# Patient Record
Sex: FEMALE | Race: WHITE | NOT HISPANIC OR LATINO | URBAN - METROPOLITAN AREA
[De-identification: names, ages, dates, MRNs, and addresses within clinical notes are randomized per-mention and may not be internally consistent; named-entity substitution may affect disease eponyms.]

---

## 2019-06-25 ENCOUNTER — MEDICAL CORRESPONDENCE (OUTPATIENT)
Dept: HEALTH INFORMATION MANAGEMENT | Facility: CLINIC | Age: 24
End: 2019-06-25

## 2019-07-13 ENCOUNTER — TRANSFERRED RECORDS (OUTPATIENT)
Dept: HEALTH INFORMATION MANAGEMENT | Facility: CLINIC | Age: 24
End: 2019-07-13

## 2019-07-15 ENCOUNTER — TRANSFERRED RECORDS (OUTPATIENT)
Dept: HEALTH INFORMATION MANAGEMENT | Facility: CLINIC | Age: 24
End: 2019-07-15

## 2019-07-18 DIAGNOSIS — H15.019 ANTERIOR SCLERITIS, UNSPECIFIED LATERALITY: Primary | ICD-10-CM

## 2019-07-19 ENCOUNTER — OFFICE VISIT (OUTPATIENT)
Dept: OPHTHALMOLOGY | Facility: CLINIC | Age: 24
End: 2019-07-19
Attending: OPHTHALMOLOGY
Payer: COMMERCIAL

## 2019-07-19 DIAGNOSIS — H15.89 OTHER DISORDERS OF SCLERA: ICD-10-CM

## 2019-07-19 DIAGNOSIS — H15.012 ANTERIOR SCLERITIS OF EYE, LEFT: Primary | ICD-10-CM

## 2019-07-19 DIAGNOSIS — Z79.2 PROPHYLACTIC ANTIBIOTIC: ICD-10-CM

## 2019-07-19 DIAGNOSIS — H15.019 ANTERIOR SCLERITIS, UNSPECIFIED LATERALITY: ICD-10-CM

## 2019-07-19 PROCEDURE — G0463 HOSPITAL OUTPT CLINIC VISIT: HCPCS | Mod: ZF

## 2019-07-19 PROCEDURE — 92133 CPTRZD OPH DX IMG PST SGM ON: CPT | Mod: ZF | Performed by: OPHTHALMOLOGY

## 2019-07-19 PROCEDURE — 92134 CPTRZ OPH DX IMG PST SGM RTA: CPT | Mod: ZF | Performed by: OPHTHALMOLOGY

## 2019-07-19 RX ORDER — DOCUSATE SODIUM 100 MG/1
1 CAPSULE, LIQUID FILLED ORAL
COMMUNITY
End: 2019-10-18

## 2019-07-19 RX ORDER — MULTIVITAMIN
TABLET ORAL DAILY
COMMUNITY
End: 2019-10-18

## 2019-07-19 RX ORDER — DOXYCYCLINE HYCLATE 100 MG
TABLET ORAL DAILY
Refills: 0 | COMMUNITY
Start: 2019-01-18

## 2019-07-19 RX ORDER — MULTIVIT WITH MINERALS/LUTEIN
250 TABLET ORAL
COMMUNITY

## 2019-07-19 RX ORDER — PREDNISONE 20 MG/1
60 TABLET ORAL DAILY
Qty: 90 TABLET | Refills: 0 | Status: SHIPPED | OUTPATIENT
Start: 2019-07-19 | End: 2019-10-18

## 2019-07-19 RX ORDER — SULFAMETHOXAZOLE/TRIMETHOPRIM 800-160 MG
1 TABLET ORAL EVERY OTHER DAY
Qty: 30 TABLET | Refills: 0 | Status: SHIPPED | OUTPATIENT
Start: 2019-07-19 | End: 2019-10-18

## 2019-07-19 RX ORDER — FLUCONAZOLE 150 MG/1
TABLET ORAL
COMMUNITY
Start: 2019-07-13 | End: 2019-10-18

## 2019-07-19 RX ORDER — PREDNISOLONE ACETATE 10 MG/ML
SUSPENSION/ DROPS OPHTHALMIC
Refills: 0 | COMMUNITY
Start: 2019-05-13 | End: 2019-10-18

## 2019-07-19 ASSESSMENT — TONOMETRY
IOP_METHOD: TONOPEN
OS_IOP_MMHG: 19
OD_IOP_MMHG: 19

## 2019-07-19 ASSESSMENT — VISUAL ACUITY
METHOD: SNELLEN - LINEAR
OS_SC: 20/20
OD_SC: 20/20

## 2019-07-19 ASSESSMENT — CONF VISUAL FIELD
OS_NORMAL: 1
METHOD: COUNTING FINGERS
OD_NORMAL: 1

## 2019-07-19 ASSESSMENT — SLIT LAMP EXAM - LIDS
COMMENTS: NORMAL
COMMENTS: NORMAL

## 2019-07-19 ASSESSMENT — CUP TO DISC RATIO
OS_RATIO: 0.25
OD_RATIO: 0.25

## 2019-07-19 ASSESSMENT — EXTERNAL EXAM - LEFT EYE: OS_EXAM: NORMAL

## 2019-07-19 ASSESSMENT — EXTERNAL EXAM - RIGHT EYE: OD_EXAM: NORMAL

## 2019-07-19 NOTE — PROGRESS NOTES
HPI: Hayley Parson is a 24 year old female referred by Jared Veras and Rajesh Connelly for scleritis evaluation. She reports sudden onset of intense left eye redness on the morning of 5.10.19, with left eye pain developing in the pm. Her symptoms worsened over the next few days, so she presented for an eye exam and was diagnosed with episcleritis initially and then scleritis.   She used Predforte for about 2 weeks, which did not seem to help, and then added ibuprofen, which relieved the pain but not redness and also caused stomach upset. Other NSAIDs have seemed similar to ibuprofen. She reports that the redness went away with high dose oral prednisone (max dose 60 mg/day) but then recurred on lower doses. Four days ago, her prednisone was increased from 10 mg/day to 20 mg/day but she still is symptomatic, with left eye redness and a constant stabbing left eye pain. She also describes intermittent pain radiating to her left cheek. No visual complaints.    Current ocular medications: oral prednisone (20 mg/day since 7.15.19; started 60 mg/day 6.4.19 for 1 week then rapid taper with flare just after stopping, increased to 20 mg/day 6.25.19 with slower taper flaring at 10 mg/day), naproxen as needed (GI upset so not taking often).  Prior ocular medications: Predforte (ineffective), ibuprofen (only partially effective, GI upset), indomethacin (only partially effective - helped with pain but not redness, GI upset).    Ocular history:   1. Scleritis/episcleritis left eye, first diagnosed 5.13.19.  2. No history of ocular trauma, surgery, laser, or injection.    Medical history:  1. GERD  2. Constipation  3. H/o anemia  4. Endometriosis  5. Raynaud's  6. Depression  7. S/p ear tubes placement (1995), appendectomy (~2008), wisdom teeth removal (~2009), tonsillectomy (June 2018), and ovarian cysts removal (Oct 2018)    Review of systems: Positive for headaches associated with eye pain, chest pain (point tenderness along  ribs/sternum), back pain exacerbated by activity since 9th grade, knee pain (left worse than right, stiff with inactivity), and constipation.  Negative for fatigue, weight loss, fevers, chills, night sweats, seizure, fainting, numbness/tingling, weakness, oral ulcers, genital ulcers, pathergy reaction, recurrent nosebleeds, sinusitis, hearing loss, tinnitus, chronic cough, shortness of breath, skin rash, tick bite, easy bruising, easy bleeding, recurrent diarrhea, bloody stools, bloody urine.    Family history: Negative for ocular disease. Positive for celiac disease (mother), Charcot-Christen-Tooth disorder (maternal aunt), thyroid disease, diabetes mellitus.    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 She reports fewer than 1 alcoholic beverage per 2 weeks. No IV drug use. No pets. She has never lived outside the [place]. She is not pregnant and is not planning to become pregnant in the near future.    Laboratory/Imaging Results:  She reports negative Tb testing within the last 2 years (for work).  5.22.19 syphilis IgM/IgG, Lyme, complete blood count (CBC) with diff, RF, ANCA, anti-MPO, anti-Pro3, uric acid, ESR (10), and CRP all normal/negative.  6.5.19 lysozyme, ACE (35, normal 8-52), and HLA-B27 all normal/negative.  7.10.19 SI joint Xray performed - results not available for review.  7.19.19 Quantiferon-Tb, anti-CCP, EMMA, anti-dsDNA all normal/negative.    Ocular Imaging:  Macular OCT 7.19.19:  Right eye: thick choroid on AVA, no chorioretinal folds, good foveal contour, no IRF/SRF,  um  Left eye: thick choroid on AVA, no chorioretinal folds, good foveal contour, no IRF/SRF,  um  Choroidal thickness symmetric, with right eye ~ left eye.    Retinal nerve fiber layer OCT 7.19.19: within normal limits both eyes.    Impression/Plan:  1. 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 No associated keratitis,  uveitis, or posterior scleritis. Poor tolerance of oral NSAIDs. Active today on 20 mg/day oral prednisone.   - Quantiferon-Tb, EMMA, anti-dsDNA, anti-CCP today to complete scleritis work-up - all negative (see results above)   - Chest x-ray ordered but not completed; recommend to help rule out sarcoidosis   - Increase oral prednisone to 60 mg/day for 2 weeks then 40 mg/day for 2 weeks then 20 mg/day (may adjust dose if needed for mild flare while tapering)   - Start Bactrim DS every other day for Pneumocystis pneumonia prophylaxis while taking >= 20 mg/day oral prednisone   - Start calcium-vitamin D supplementation while on prednisone   - [place] rheumatology consult placed to evaluate for systemic inflammatory disease and if needed to manage systemic steroid-sparing immunosuppression (office staff to help coordinate)   - If scleritis flares during oral prednisone taper, or patient is unable to taper off oral prednisone without scleritis flaring, then I recommend that she start either methotrexate (goal dose 20-25 mg/wk) or CellCept (goal dose 1703-0447 mg twice a day) with rheumatology management    Return to clinic with Dr. Connelly in 3 weeks. Return to uveitis clinic in 2-3 months, V/T.    Attending Physician Attestation:  Complete documentation of historical and exam elements from today's encounter can be found in the full encounter summary report (not reduplicated in this progress note).  I personally obtained the chief complaint(s) and history of present illness.  I confirmed and edited as necessary the review of systems, past medical/surgical history, family history, social history, and examination findings as documented by others; and I examined the patient myself.  I personally reviewed the relevant tests, images, and reports as documented above.  I formulated and edited as necessary the assessment and plan and discussed the findings and management plan with the patient and family.  - Adriana Peters,  M.D.

## 2019-07-19 NOTE — LETTER
7/19/2019       RE: Hayley Parson  [address]  [facility] [address]     Dear Colleague,    Thank you for referring your patient, Hayley Parson, to the EYE CLINIC at [facility]. Please see a copy of my visit note below.    HPI: Hayley Parson is a 24 year old female referred by Jared Veras and Rajesh Connelly for scleritis evaluation. She reports sudden onset of intense left eye redness on the morning of 5.10.19, with left eye pain developing in the pm. Her symptoms worsened over the next few days, so she presented for an eye exam and was diagnosed with episcleritis initially and then scleritis.   She used Predforte for about 2 weeks, which did not seem to help, and then added ibuprofen, which relieved the pain but not redness and also caused stomach upset. Other NSAIDs have seemed similar to ibuprofen. She reports that the redness went away with high dose oral prednisone (max dose 60 mg/day) but then recurred on lower doses. Four days ago, her prednisone was increased from 10 mg/day to 20 mg/day but she still is symptomatic, with left eye redness and a constant stabbing left eye pain. She also describes intermittent pain radiating to her left cheek. No visual complaints.    Current ocular medications: oral prednisone (20 mg/day since 7.15.19; started 60 mg/day 6.4.19 for 1 week then rapid taper with flare just after stopping, increased to 20 mg/day 6.25.19 with slower taper flaring at 10 mg/day), naproxen as needed (GI upset so not taking often).  Prior ocular medications: Predforte (ineffective), ibuprofen (only partially effective, GI upset), indomethacin (only partially effective - helped with pain but not redness, GI upset).    Ocular history:   1. Scleritis/episcleritis left eye, first diagnosed 5.13.19.  2. No history of ocular trauma, surgery, laser, or injection.    Medical history:  1. GERD  2. Constipation  3. H/o anemia  4. Endometriosis  5. Raynaud's  6.  Depression  7. S/p ear tubes placement (1995), appendectomy (~2008), wisdom teeth removal (~2009), tonsillectomy (June 2018), and ovarian cysts removal (Oct 2018)    Review of systems: Positive for headaches associated with eye pain, chest pain (point tenderness along ribs/sternum), back pain exacerbated by activity since 9th grade, knee pain (left worse than right, stiff with inactivity), and constipation.  Negative for fatigue, weight loss, fevers, chills, night sweats, seizure, fainting, numbness/tingling, weakness, oral ulcers, genital ulcers, pathergy reaction, recurrent nosebleeds, sinusitis, hearing loss, tinnitus, chronic cough, shortness of breath, skin rash, tick bite, easy bruising, easy bleeding, recurrent diarrhea, bloody stools, bloody urine.    Family history: Negative for ocular disease. Positive for celiac disease (mother), Charcot-Christen-Tooth disorder (maternal aunt), thyroid disease, diabetes mellitus.    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 She reports fewer than 1 alcoholic beverage per 2 weeks. No IV drug use. No pets. She has never lived outside the [place]. She is not pregnant and is not planning to become pregnant in the near future.    Laboratory/Imaging Results:  She reports negative Tb testing within the last 2 years (for work).  5.22.19 syphilis IgM/IgG, Lyme, complete blood count (CBC) with diff, RF, ANCA, anti-MPO, anti-Pro3, uric acid, ESR (10), and CRP all normal/negative.  6.5.19 lysozyme, ACE (35, normal 8-52), and HLA-B27 all normal/negative.  7.10.19 SI joint Xray performed - results not available for review.  7.19.19 Quantiferon-Tb, anti-CCP, EMMA, anti-dsDNA all normal/negative.    Ocular Imaging:  Macular OCT 7.19.19:  Right eye: thick choroid on AVA, no chorioretinal folds, good foveal contour, no IRF/SRF,  um  Left eye: thick choroid on AVA, no chorioretinal folds, good foveal contour, no IRF/SRF,  um  Choroidal  thickness symmetric, with right eye ~ left eye.    Retinal nerve fiber layer OCT 7.19.19: within normal limits both eyes.    Impression/Plan:  1. 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 No associated keratitis, uveitis, or posterior scleritis. Poor tolerance of oral NSAIDs. Active today on 20 mg/day oral prednisone.   - Quantiferon-Tb, EMMA, anti-dsDNA, anti-CCP today to complete scleritis work-up - all negative (see results above)   - Chest x-ray ordered but not completed; recommend to help rule out sarcoidosis   - Increase oral prednisone to 60 mg/day for 2 weeks then 40 mg/day for 2 weeks then 20 mg/day (may adjust dose if needed for mild flare while tapering)   - Start Bactrim DS every other day for Pneumocystis pneumonia prophylaxis while taking >= 20 mg/day oral prednisone   - Start calcium-vitamin D supplementation while on prednisone   - [place] rheumatology consult placed to evaluate for systemic inflammatory disease and if needed to manage systemic steroid-sparing immunosuppression (office staff to help coordinate)   - If scleritis flares during oral prednisone taper, or patient is unable to taper off oral prednisone without scleritis flaring, then I recommend that she start either methotrexate (goal dose 20-25 mg/wk) or CellCept (goal dose 8007-6863 mg twice a day) with rheumatology management    Return to clinic with Dr. Connelly in 3 weeks. Return to uveitis clinic in 2-3 months, V/T.    Attending Physician Attestation:  Complete documentation of historical and exam elements from today's encounter can be found in the full encounter summary report (not reduplicated in this progress note).  I personally obtained the chief complaint(s) and history of present illness.  I confirmed and edited as necessary the review of systems, past medical/surgical history, family history, social history, and examination findings as documented by others; and I examined  the patient myself.  I personally reviewed the relevant tests, images, and reports as documented above.  I formulated and edited as necessary the assessment and plan and discussed the findings and management plan with the patient and family.  - Adriana Peters M.D.      Again, thank you for allowing me to participate in the care of your patient.      Sincerely,    Adriana Peters MD

## 2019-07-19 NOTE — Clinical Note
Reagan Marshall, I placed a rheum consult order for this patient - would be best if this takes placed in [place], [place]. Do you mind coordinating this? Thanks, Adriana

## 2019-07-19 NOTE — NURSING NOTE
Chief Complaints and History of Present Illnesses   Patient presents with     Annual Eye Exam     Uveitis     Chief Complaint(s) and History of Present Illness(es)     Annual Eye Exam     Laterality: both eyes    Onset: gradual    Onset: months ago    Course: rapidly worsening    Associated symptoms: eye pain, redness, headache and photophobia.  Negative for glare, haloes, double vision, dryness, nausea, vomiting, flashes and floaters    Treatments tried: no treatments    Response to treatment: no improvement    Pain scale: 6/10    Comments: Uveitis              Comments     Pt reports of having redness in her left eye for about 2 months, treatment has helped but still in discomfort. States eye will be in pain first followed by left temporal sided headache only. In the past week pain has gotten worse along with the headaches.   Is in some pain this morning  Denies flashes and floaters.    Bennie GOLDEN July 19, 2019 8:28 AM  Holley STEWART July 19, 2019 8:28 AM

## 2019-07-19 NOTE — PATIENT INSTRUCTIONS
Start calcium-vitamin D supplementation (1200 mg Ca)  Start Bactrim every other day for pneumonia prophylaxis at prednisone doses >= 20 mg/day  Prednisone 60 mg/day for 2 weeks then 40 mg/day for 2 weeks then 20 mg/day  See Dr. Connelly in 3 weeks  See rheumatology in [place] to evaluate for systemic inflammatory disease and possible steroid sparing immunosuppression.

## 2019-07-22 LAB
ANA SER QL IF: NEGATIVE
CCP AB SER IA-ACNC: <1 U/ML
DSDNA AB SER-ACNC: 1 IU/ML
GAMMA INTERFERON BACKGROUND BLD IA-ACNC: 0.05 IU/ML
M TB IFN-G BLD-IMP: NEGATIVE
M TB IFN-G CD4+ BCKGRND COR BLD-ACNC: >10 IU/ML
MITOGEN IGNF BCKGRD COR BLD-ACNC: 0 IU/ML
MITOGEN IGNF BCKGRD COR BLD-ACNC: 0 IU/ML

## 2019-07-29 ENCOUNTER — TELEPHONE (OUTPATIENT)
Dept: OPHTHALMOLOGY | Facility: CLINIC | Age: 24
End: 2019-07-29

## 2019-07-31 ENCOUNTER — MEDICAL CORRESPONDENCE (OUTPATIENT)
Dept: HEALTH INFORMATION MANAGEMENT | Facility: CLINIC | Age: 24
End: 2019-07-31

## 2019-08-09 ENCOUNTER — TELEPHONE (OUTPATIENT)
Dept: OPHTHALMOLOGY | Facility: CLINIC | Age: 24
End: 2019-08-09

## 2019-08-09 NOTE — TELEPHONE ENCOUNTER
Both telephone numbers called. I was unable to leave a message on Dr. Connelly's cell phone, so I left a message at the eye clinic number. After finishing the 20 mg/day dose, patient may continue prednisone for 10 mg/day for 2 more weeks then stop.    Adriana Peters MD 5:51 PM 08/09/19        Note to Dr. Peters/faciliator for assistance with plan of care per message below  Paul Ascencio RN 11:21 AM 08/09/19          [facility] Call Center    Phone Message    May a detailed message be left on voicemail: yes    Reason for Call: Medication Question or concern regarding medication   Prescription Clarification  Name of Medication: predniSONE (DELTASONE) 20 MG tablet    Prescribing Provider: Adriana Peters MD   [id number] - [place], [place] - [address] [facility] [address]   What on the order needs clarification?   Pt doing well, reduced steriod per recommendation. Wants to know if they should discontinue after 2 weeks? Or continue until she sees Rheumatology? Please call regarding this matter asap. Thanks  - Per Dr Luz Connelly  Ophthalmologist with [facility] in [place]            Action Taken: Message routed to:  [facility] ([facility] Eye

## 2019-10-18 ENCOUNTER — OFFICE VISIT (OUTPATIENT)
Dept: OPHTHALMOLOGY | Facility: CLINIC | Age: 24
End: 2019-10-18
Attending: OPHTHALMOLOGY
Payer: COMMERCIAL

## 2019-10-18 DIAGNOSIS — H15.012 ANTERIOR SCLERITIS OF EYE, LEFT: Primary | ICD-10-CM

## 2019-10-18 DIAGNOSIS — Z79.2 PROPHYLACTIC ANTIBIOTIC: ICD-10-CM

## 2019-10-18 PROCEDURE — G0463 HOSPITAL OUTPT CLINIC VISIT: HCPCS | Mod: ZF

## 2019-10-18 RX ORDER — PREDNISONE 10 MG/1
1 TABLET ORAL 3 TIMES DAILY
Refills: 0 | COMMUNITY
Start: 2019-08-21 | End: 2020-06-12

## 2019-10-18 RX ORDER — SULFAMETHOXAZOLE/TRIMETHOPRIM 800-160 MG
1 TABLET ORAL
COMMUNITY
Start: 2018-08-27 | End: 2019-10-18

## 2019-10-18 RX ORDER — BIOTIN 10 MG
TABLET ORAL
COMMUNITY

## 2019-10-18 RX ORDER — DOCUSATE SODIUM 100 MG/1
1 CAPSULE, LIQUID FILLED ORAL DAILY
COMMUNITY

## 2019-10-18 RX ORDER — SULFAMETHOXAZOLE/TRIMETHOPRIM 800-160 MG
1 TABLET ORAL EVERY OTHER DAY
Qty: 30 TABLET | Refills: 2 | Status: SHIPPED | OUTPATIENT
Start: 2019-10-18 | End: 2020-06-12

## 2019-10-18 RX ORDER — FOLIC ACID 1 MG/1
2 TABLET ORAL DAILY
Refills: 1 | COMMUNITY
Start: 2019-10-01

## 2019-10-18 RX ORDER — PREDNISONE 10 MG/1
TABLET ORAL
Qty: 146 TABLET | Refills: 0 | Status: SHIPPED | OUTPATIENT
Start: 2019-10-18 | End: 2019-12-01

## 2019-10-18 ASSESSMENT — CUP TO DISC RATIO
OS_RATIO: 0.25
OD_RATIO: 0.25

## 2019-10-18 ASSESSMENT — CONF VISUAL FIELD
METHOD: COUNTING FINGERS
OS_NORMAL: 1
OD_NORMAL: 1

## 2019-10-18 ASSESSMENT — SLIT LAMP EXAM - LIDS
COMMENTS: NORMAL
COMMENTS: NORMAL

## 2019-10-18 ASSESSMENT — TONOMETRY
IOP_METHOD: TONOPEN
OS_IOP_MMHG: 22
OD_IOP_MMHG: 19

## 2019-10-18 ASSESSMENT — VISUAL ACUITY
OS_SC: 20/20
OD_SC+: -1
METHOD: SNELLEN - LINEAR
OD_SC: 20/20

## 2019-10-18 ASSESSMENT — EXTERNAL EXAM - RIGHT EYE: OD_EXAM: NORMAL

## 2019-10-18 ASSESSMENT — EXTERNAL EXAM - LEFT EYE: OS_EXAM: NORMAL

## 2019-10-18 NOTE — LETTER
10/18/2019    RE: Hayley Parson  [address]  [facility] [address]     Dear Colleagues:    HPI: Hayley Parson is a 24 year old female here for 2.5 month follow up of scleritis left eye. She reports that the redness went away with high dose oral prednisone (max dose 60 mg/day) but then again recurred on lower doses; she feels that scleritis seems to flare at about 20 mg/day oral pred. No visual complaints but left eye is red and uncomfortable.    Current ocular medications: oral prednisone (30 mg/day for 3-4 weeks, variable doses since 6.4.19 with max dose 60 mg/day), naproxen as needed (GI upset so not taking often), methotrexate 22.5 mg/wk (increased from 12.5 mg/wk 3 weeks ago, started in mid-August 2019, s/e nausea and fatigue), folic acid 2 mg/day, Bactrim every other day. IMT prescribed by rheum service (Dr. Haritha Olmedo and Cookie BARRETT-CNP in [place]).  Prior ocular medications: Predforte (ineffective), ibuprofen (only partially effective, GI upset), indomethacin (only partially effective - helped with pain but not redness, GI upset).    Ocular history:   1. Scleritis/episcleritis left eye, first diagnosed 5.13.19.   - Sudden onset of intense left eye redness on the morning of 5.10.19, with left eye pain developing in the pm. Her symptoms worsened over the next few days, so she presented for an eye exam and was diagnosed with episcleritis initially and then scleritis.  2. No history of ocular trauma, surgery, laser, or injection.    Medical history:  1. GERD  2. Constipation  3. H/o anemia  4. Endometriosis  5. Raynaud's  6. Depression  7. S/p ear tubes placement (1995), appendectomy (~2008), wisdom teeth removal (~2009), tonsillectomy (June 2018), and ovarian cysts removal (Oct 2018)  No systemic inflammatory disease found by rheumatology evaluation in 2019.    Family history: Negative for ocular disease. Positive for celiac disease (mother), Charcot-Christen-Tooth disorder (maternal aunt),  thyroid disease, diabetes mellitus.    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 She reports fewer than 1 alcoholic beverage per 2 weeks. No IV drug use. No pets. She has never lived outside the [place]. She is not pregnant and is not planning to become pregnant in the near future.    Laboratory/Imaging Results:  She reports negative Tb testing within the last 2 years (for work).  5.22.19 syphilis IgM/IgG, Lyme, complete blood count (CBC) with diff, RF, ANCA, anti-MPO, anti-Pro3, uric acid, ESR (10), and CRP all normal/negative.  6.5.19 lysozyme, ACE (35, normal 8-52), and HLA-B27 all normal/negative.  7.10.19 SI joint Xray performed - results not available for review.  7.19.19 Quantiferon-Tb, anti-CCP, EMMA, anti-dsDNA all normal/negative.    Ocular Imaging:  Macular OCT 7.19.19:  Right eye: thick choroid on AVA, no chorioretinal folds, good foveal contour, no IRF/SRF,  um  Left eye: thick choroid on AVA, no chorioretinal folds, good foveal contour, no IRF/SRF,  um  Choroidal thickness symmetric, with right eye ~ left eye.    Retinal nerve fiber layer OCT 7.19.19: within normal limits both eyes.    Impression/Plan:  1. Idiopathic non-necrotizing anterior scleritis left eye. No associated keratitis, uveitis, or posterior scleritis. Although herpetic etiology is a consideration, the history is not suggestive (could consider an empiric course of Valtrex in the future if poorly responsive to IMT). Poor tolerance of oral NSAIDs. Mild activity today on 30 mg/day oral prednisone and 22.5 mg/wk methotrexate (has been taking for about 2 months and at ocular treatment dose for about 3 weeks).   - Increase oral prednisone to 40 mg/day for 2 weeks then 30 mg/day. 2 days prior to next eye exam decrease to 20 mg/day (may adjust dose if needed for mild flare while tapering).    - Bactrim DS every other day for Pneumocystis pneumonia prophylaxis while taking >= 20 mg/day oral  prednisone   - Calcium-vitamin D supplementation while on prednisone.   - Continue methotrexate 22.5 mg/wk, appreciate management by [place] rheumatology.   - If methotrexate is ineffective, alternative IMT such as CellCept (goal dose 1293-7327 mg twice a day) or Humira q2wk with rheumatology management should be considered.    Return to uveitis clinic in 5 weeks, V/T. I anticipate there should be at least a partial effect from methotrexate seen at this visit. If scleritis seems to be flaring on 20 mg/day oral prednisone after 3 months of methotrexate, I think switching to another agent is indicated.    Attending Physician Attestation:  Complete documentation of historical and exam elements from today's encounter can be found in the full encounter summary report (not reduplicated in this progress note).  I personally obtained the chief complaint(s) and history of present illness.  I confirmed and edited as necessary the review of systems, past medical/surgical history, family history, social history, and examination findings as documented by others; and I examined the patient myself.  I personally reviewed the relevant tests, images, and reports as documented above.  I formulated and edited as necessary the assessment and plan and discussed the findings and management plan with the patient and family.  - Adriana Peters M.D.      ]Sincerely,      Adriana Peters MD

## 2019-10-18 NOTE — PROGRESS NOTES
HPI: Hayley Parson is a 24 year old female here for 2.5 month follow up of scleritis left eye. She reports that the redness went away with high dose oral prednisone (max dose 60 mg/day) but then again recurred on lower doses; she feels that scleritis seems to flare at about 20 mg/day oral pred. No visual complaints but left eye is red and uncomfortable.    Current ocular medications: oral prednisone (30 mg/day for 3-4 weeks, variable doses since 6.4.19 with max dose 60 mg/day), naproxen as needed (GI upset so not taking often), methotrexate 22.5 mg/wk (increased from 12.5 mg/wk 3 weeks ago, started in mid-August 2019, s/e nausea and fatigue), folic acid 2 mg/day, Bactrim every other day. IMT prescribed by rheum service (Dr. Haritha Olmedo and Cookie BARRETT-CNP in [place]).  Prior ocular medications: Predforte (ineffective), ibuprofen (only partially effective, GI upset), indomethacin (only partially effective - helped with pain but not redness, GI upset).    Ocular history:   1. Scleritis/episcleritis left eye, first diagnosed 5.13.19.   - Sudden onset of intense left eye redness on the morning of 5.10.19, with left eye pain developing in the pm. Her symptoms worsened over the next few days, so she presented for an eye exam and was diagnosed with episcleritis initially and then scleritis.  2. No history of ocular trauma, surgery, laser, or injection.    Medical history:  1. GERD  2. Constipation  3. H/o anemia  4. Endometriosis  5. Raynaud's  6. Depression  7. S/p ear tubes placement (1995), appendectomy (~2008), wisdom teeth removal (~2009), tonsillectomy (June 2018), and ovarian cysts removal (Oct 2018)  No systemic inflammatory disease found by rheumatology evaluation in 2019.    Family history: Negative for ocular disease. Positive for celiac disease (mother), Charcot-Christen-Tooth disorder (maternal aunt), thyroid disease, diabetes mellitus.    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 She never smoked. She reports fewer than 1 alcoholic beverage per 2 weeks. No IV drug use. No pets. She has never lived outside the [place]. She is not pregnant and is not planning to become pregnant in the near future.    Laboratory/Imaging Results:  She reports negative Tb testing within the last 2 years (for work).  5.22.19 syphilis IgM/IgG, Lyme, complete blood count (CBC) with diff, RF, ANCA, anti-MPO, anti-Pro3, uric acid, ESR (10), and CRP all normal/negative.  6.5.19 lysozyme, ACE (35, normal 8-52), and HLA-B27 all normal/negative.  7.10.19 SI joint Xray performed - results not available for review.  7.19.19 Quantiferon-Tb, anti-CCP, EMMA, anti-dsDNA all normal/negative.    Ocular Imaging:  Macular OCT 7.19.19:  Right eye: thick choroid on AVA, no chorioretinal folds, good foveal contour, no IRF/SRF,  um  Left eye: thick choroid on AVA, no chorioretinal folds, good foveal contour, no IRF/SRF,  um  Choroidal thickness symmetric, with right eye ~ left eye.    Retinal nerve fiber layer OCT 7.19.19: within normal limits both eyes.    Impression/Plan:  1. Idiopathic non-necrotizing anterior scleritis left eye. No associated keratitis, uveitis, or posterior scleritis. Although herpetic etiology is a consideration, the history is not suggestive (could consider an empiric course of Valtrex in the future if poorly responsive to IMT). Poor tolerance of oral NSAIDs. Mild activity today on 30 mg/day oral prednisone and 22.5 mg/wk methotrexate (has been taking for about 2 months and at ocular treatment dose for about 3 weeks).   - Increase oral prednisone to 40 mg/day for 2 weeks then 30 mg/day. 2 days prior to next eye exam decrease to 20 mg/day (may adjust dose if needed for mild flare while tapering).    - Bactrim DS every other day for Pneumocystis pneumonia prophylaxis while taking >= 20 mg/day oral prednisone   - Calcium-vitamin D supplementation while on prednisone.   - Continue methotrexate  22.5 mg/wk, appreciate management by [place] [facility].   - If methotrexate is ineffective, alternative IMT such as CellCept (goal dose 8979-7754 mg twice a day) or Humira q2wk with rheumatology management should be considered.    Return to uveitis clinic in 5 weeks, V/T. I anticipate there should be at least a partial effect from methotrexate seen at this visit. If scleritis seems to be flaring on 20 mg/day oral prednisone after 3 months of methotrexate, I think switching to another agent is indicated.    Attending Physician Attestation:  Complete documentation of historical and exam elements from today's encounter can be found in the full encounter summary report (not reduplicated in this progress note).  I personally obtained the chief complaint(s) and history of present illness.  I confirmed and edited as necessary the review of systems, past medical/surgical history, family history, social history, and examination findings as documented by others; and I examined the patient myself.  I personally reviewed the relevant tests, images, and reports as documented above.  I formulated and edited as necessary the assessment and plan and discussed the findings and management plan with the patient and family.  - Adriana Peters M.D.

## 2019-10-18 NOTE — NURSING NOTE
Chief Complaints and History of Present Illnesses   Patient presents with     Uveitis Follow-Up     Chief Complaint(s) and History of Present Illness(es)     Uveitis Follow-Up     Laterality: left eye              Comments     Anterior scleritis of eye, left. Hayley says LE might be a little better, but not by much. Le usually feels uncomfortable and sometimes a sharp pain.     Joo Balderas COT 1:57 PM October 18, 2019

## 2019-10-18 NOTE — PATIENT INSTRUCTIONS
Continue methotrexate, folic acid, calcium/vitamin D, and Bactrim.  Oral prednisone: increase to 40 mg/day for 2 weeks then 30 mg/day. Then decrease to 20 mg/day 2 days prior to next eye clinic visit.

## 2019-11-22 ENCOUNTER — OFFICE VISIT (OUTPATIENT)
Dept: OPHTHALMOLOGY | Facility: CLINIC | Age: 24
End: 2019-11-22
Attending: OPHTHALMOLOGY
Payer: COMMERCIAL

## 2019-11-22 DIAGNOSIS — H15.012 ANTERIOR SCLERITIS OF EYE, LEFT: Primary | ICD-10-CM

## 2019-11-22 PROCEDURE — G0463 HOSPITAL OUTPT CLINIC VISIT: HCPCS | Mod: ZF

## 2019-11-22 RX ORDER — PREDNISONE 5 MG/1
15 TABLET ORAL DAILY
Qty: 90 TABLET | Refills: 3 | Status: SHIPPED | OUTPATIENT
Start: 2019-11-22 | End: 2020-06-12

## 2019-11-22 ASSESSMENT — TONOMETRY
OS_IOP_MMHG: 16
OD_IOP_MMHG: 18
IOP_METHOD: TONOPEN

## 2019-11-22 ASSESSMENT — SLIT LAMP EXAM - LIDS
COMMENTS: NORMAL
COMMENTS: NORMAL

## 2019-11-22 ASSESSMENT — CUP TO DISC RATIO
OS_RATIO: 0.25
OD_RATIO: 0.25

## 2019-11-22 ASSESSMENT — CONF VISUAL FIELD
METHOD: COUNTING FINGERS
OD_NORMAL: 1
OS_NORMAL: 1

## 2019-11-22 ASSESSMENT — VISUAL ACUITY
OD_SC+: -2
METHOD: SNELLEN - LINEAR
OS_SC: 20/20
OD_SC: 20/20

## 2019-11-22 ASSESSMENT — EXTERNAL EXAM - RIGHT EYE: OD_EXAM: NORMAL

## 2019-11-22 ASSESSMENT — EXTERNAL EXAM - LEFT EYE: OS_EXAM: NORMAL

## 2019-11-22 NOTE — PATIENT INSTRUCTIONS
Continue methotrexate and folic acid.  Prednisone: 20 mg/day for 3 weeks, then 15 mg/day for 3 weeks, then 10 mg/day.  May stop Bactrim when prednisone is less than 20 mg/day (take for 3 more weeks).  Continue calcium-vitamin D.

## 2019-11-22 NOTE — PROGRESS NOTES
HPI: Hayley Parson is a 24 year old female here for 5 week follow up of scleritis left eye. She reports that she developed am eye pain left eye when prednisone was decreased from 40->30 mg/day, then noticed more consistent eye pain and redness left eye with 30->20 mg/day prednisone. Insurance denied request for Humira.    Current ocular medications: oral prednisone (20 mg/day for 3 days, variable doses since 6.4.19 with max dose 60 mg/day), naproxen as needed (GI upset so not taking often), methotrexate 25 mg/wk (increased from 22.5 mg/wk 2 weeks ago, increased from 12.5 mg/wk to 22.5 mg/wk beginning of Oct 2019, started in mid-August 2019, s/e nausea and fatigue), folic acid 2 mg/day, Bactrim every other day. IMT prescribed by rheum service (Dr. Haritha Olmedo and Cookie BARRETT-CNP in [place]).  Prior ocular medications: Predforte (ineffective), ibuprofen (only partially effective, GI upset), indomethacin (only partially effective - helped with pain but not redness, GI upset).    Ocular history:   1. Scleritis/episcleritis left eye, first diagnosed 5.13.19.   - Sudden onset of intense left eye redness on the morning of 5.10.19, with left eye pain developing in the pm. Her symptoms worsened over the next few days, so she presented for an eye exam and was diagnosed with episcleritis initially and then scleritis.  2. No history of ocular trauma, surgery, laser, or injection.    Medical history:  1. GERD  2. Constipation  3. H/o anemia  4. Endometriosis  5. Raynaud's  6. Depression  7. S/p ear tubes placement (1995), appendectomy (~2008), wisdom teeth removal (~2009), tonsillectomy (June 2018), and ovarian cysts removal (Oct 2018)  No systemic inflammatory disease found by rheumatology evaluation in 2019.    Family history: Negative for ocular disease. Positive for celiac disease (mother), Charcot-Christen-Tooth disorder (maternal aunt), thyroid disease, diabetes mellitus.    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 She never smoked. She reports fewer than 1 alcoholic beverage per 2 weeks. No IV drug use. No pets. She has never lived outside the [place]. She is not pregnant and is not planning to become pregnant in the near future.    Laboratory/Imaging Results:  She reports negative Tb testing within the last 2 years (for work).  5.22.19 syphilis IgM/IgG, Lyme, complete blood count (CBC) with diff, RF, ANCA, anti-MPO, anti-Pro3, uric acid, ESR (10), and CRP all normal/negative.  6.5.19 lysozyme, ACE (35, normal 8-52), and HLA-B27 all normal/negative.  7.10.19 SI joint Xray performed - results not available for review.  7.19.19 Quantiferon-Tb, anti-CCP, EMMA, anti-dsDNA all normal/negative.    Ocular Imaging:  Macular OCT 7.19.19:  Right eye: thick choroid on AVA, no chorioretinal folds, good foveal contour, no IRF/SRF,  um  Left eye: thick choroid on AVA, no chorioretinal folds, good foveal contour, no IRF/SRF,  um  Choroidal thickness symmetric, with right eye ~ left eye.    Retinal nerve fiber layer OCT 7.19.19: within normal limits both eyes.    Impression/Plan:  1. Idiopathic non-necrotizing anterior scleritis left eye. No associated keratitis, uveitis, or posterior scleritis. Although herpetic etiology is a consideration, the history is not suggestive (could consider an empiric course of Valtrex in the future if poorly responsive to IMT). Poor tolerance of oral NSAIDs. Minimal activity today on 20 mg/day oral prednisone and 25 mg/wk methotrexate (exam improved today compared to last visit when patient on 30 mg/day oral prednisone and 22.5 mg/wk methotrexate), so there appears to be at least a partial effect from methotrexate.   - Continue oral prednisone at 20 mg/day for 3 weeks then 15 mg/day for 3 weeks then 10 mg/day (may adjust dose if needed for mild flare while tapering).    - Bactrim DS every other day for Pneumocystis pneumonia prophylaxis while taking >= 20 mg/day oral  prednisone   - Calcium-vitamin D supplementation while on prednisone.   - Continue methotrexate 25 mg/wk, appreciate management by [place] [facility].   - If methotrexate is ineffective, alternative IMT such as CellCept (goal dose 4227-9549 mg twice a day) or Humira q2wk with rheumatology management should be considered. Since there appears to be at least a partial effect with methotrexate, will re-evaluate again next visit with oral prednisone taper. In the meantime, it may be helpful to appeal Humira; I am happy to provide a letter if this chart note is not sufficient.    Return to uveitis clinic in 7 weeks, V/T.    Attending Physician Attestation:  Complete documentation of historical and exam elements from today's encounter can be found in the full encounter summary report (not reduplicated in this progress note).  I personally obtained the chief complaint(s) and history of present illness.  I confirmed and edited as necessary the review of systems, past medical/surgical history, family history, social history, and examination findings as documented by others; and I examined the patient myself.  I personally reviewed the relevant tests, images, and reports as documented above.  I formulated and edited as necessary the assessment and plan and discussed the findings and management plan with the patient and family.  - Adriana Peters M.D.

## 2019-11-22 NOTE — LETTER
11/22/2019     RE: Hayley Parson  [address]  [facility] [address]     Dear Colleague,    Thank you for referring your patient, Hayley Parson, to the EYE CLINIC at [facility]. Please see a copy of my visit note below.    HPI: Hayley Parson is a 24 year old female here for 5 week follow up of scleritis left eye. She reports that she developed am eye pain left eye when prednisone was decreased from 40->30 mg/day, then noticed more consistent eye pain and redness left eye with 30->20 mg/day prednisone. Insurance denied request for Humira.    Current ocular medications: oral prednisone (20 mg/day for 3 days, variable doses since 6.4.19 with max dose 60 mg/day), naproxen as needed (GI upset so not taking often), methotrexate 25 mg/wk (increased from 22.5 mg/wk 2 weeks ago, increased from 12.5 mg/wk to 22.5 mg/wk beginning of Oct 2019, started in mid-August 2019, s/e nausea and fatigue), folic acid 2 mg/day, Bactrim every other day. IMT prescribed by rheum service (Dr. Haritha Olmedo and Cookie BARRETT-CNP in [place]).  Prior ocular medications: Predforte (ineffective), ibuprofen (only partially effective, GI upset), indomethacin (only partially effective - helped with pain but not redness, GI upset).    Ocular history:   1. Scleritis/episcleritis left eye, first diagnosed 5.13.19.   - Sudden onset of intense left eye redness on the morning of 5.10.19, with left eye pain developing in the pm. Her symptoms worsened over the next few days, so she presented for an eye exam and was diagnosed with episcleritis initially and then scleritis.  2. No history of ocular trauma, surgery, laser, or injection.    Medical history:  1. GERD  2. Constipation  3. H/o anemia  4. Endometriosis  5. Raynaud's  6. Depression  7. S/p ear tubes placement (1995), appendectomy (~2008), wisdom teeth removal (~2009), tonsillectomy (June 2018), and ovarian cysts removal (Oct 2018)  No systemic  inflammatory disease found by rheumatology evaluation in 2019.    Family history: Negative for ocular disease. Positive for celiac disease (mother), Charcot-Christen-Tooth disorder (maternal aunt), thyroid disease, diabetes mellitus.    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 She reports fewer than 1 alcoholic beverage per 2 weeks. No IV drug use. No pets. She has never lived outside the [place]. She is not pregnant and is not planning to become pregnant in the near future.    Laboratory/Imaging Results:  She reports negative Tb testing within the last 2 years (for work).  5.22.19 syphilis IgM/IgG, Lyme, complete blood count (CBC) with diff, RF, ANCA, anti-MPO, anti-Pro3, uric acid, ESR (10), and CRP all normal/negative.  6.5.19 lysozyme, ACE (35, normal 8-52), and HLA-B27 all normal/negative.  7.10.19 SI joint Xray performed - results not available for review.  7.19.19 Quantiferon-Tb, anti-CCP, EMMA, anti-dsDNA all normal/negative.    Ocular Imaging:  Macular OCT 7.19.19:  Right eye: thick choroid on AVA, no chorioretinal folds, good foveal contour, no IRF/SRF,  um  Left eye: thick choroid on AVA, no chorioretinal folds, good foveal contour, no IRF/SRF,  um  Choroidal thickness symmetric, with right eye ~ left eye.    Retinal nerve fiber layer OCT 7.19.19: within normal limits both eyes.    Impression/Plan:  1. Idiopathic non-necrotizing anterior scleritis left eye. No associated keratitis, uveitis, or posterior scleritis. Although herpetic etiology is a consideration, the history is not suggestive (could consider an empiric course of Valtrex in the future if poorly responsive to IMT). Poor tolerance of oral NSAIDs. Minimal activity today on 20 mg/day oral prednisone and 25 mg/wk methotrexate (exam improved today compared to last visit when patient on 30 mg/day oral prednisone and 22.5 mg/wk methotrexate), so there appears to be at least a partial effect from  methotrexate.   - Continue oral prednisone at 20 mg/day for 3 weeks then 15 mg/day for 3 weeks then 10 mg/day (may adjust dose if needed for mild flare while tapering).    - Bactrim DS every other day for Pneumocystis pneumonia prophylaxis while taking >= 20 mg/day oral prednisone   - Calcium-vitamin D supplementation while on prednisone.   - Continue methotrexate 25 mg/wk, appreciate management by [place] [facility].   - If methotrexate is ineffective, alternative IMT such as CellCept (goal dose 1395-4057 mg twice a day) or Humira q2wk with rheumatology management should be considered. Since there appears to be at least a partial effect with methotrexate, will re-evaluate again next visit with oral prednisone taper. In the meantime, it may be helpful to appeal Humira; I am happy to provide a letter if this chart note is not sufficient.    Return to uveitis clinic in 7 weeks, V/T.    Attending Physician Attestation:  Complete documentation of historical and exam elements from today's encounter can be found in the full encounter summary report (not reduplicated in this progress note).  I personally obtained the chief complaint(s) and history of present illness.  I confirmed and edited as necessary the review of systems, past medical/surgical history, family history, social history, and examination findings as documented by others; and I examined the patient myself.  I personally reviewed the relevant tests, images, and reports as documented above.  I formulated and edited as necessary the assessment and plan and discussed the findings and management plan with the patient and family.  - Adriana Peters M.D.

## 2019-11-22 NOTE — LETTER
11/22/2019      RE: Hayley Parson  [address]  [place] [address]       HPI: Hayley Parson is a 24 year old female here for 5 week follow up of scleritis left eye. She reports that she developed am eye pain left eye when prednisone was decreased from 40->30 mg/day, then noticed more consistent eye pain and redness left eye with 30->20 mg/day prednisone. Insurance denied request for Humira.    Current ocular medications: oral prednisone (20 mg/day for 3 days, variable doses since 6.4.19 with max dose 60 mg/day), naproxen as needed (GI upset so not taking often), methotrexate 25 mg/wk (increased from 22.5 mg/wk 2 weeks ago, increased from 12.5 mg/wk to 22.5 mg/wk beginning of Oct 2019, started in mid-August 2019, s/e nausea and fatigue), folic acid 2 mg/day, Bactrim every other day. IMT prescribed by rheum service (Dr. Haritha Olmedo and Cookie BARRETT-CNP in [place]).  Prior ocular medications: Predforte (ineffective), ibuprofen (only partially effective, GI upset), indomethacin (only partially effective - helped with pain but not redness, GI upset).    Ocular history:   1. Scleritis/episcleritis left eye, first diagnosed 5.13.19.   - Sudden onset of intense left eye redness on the morning of 5.10.19, with left eye pain developing in the pm. Her symptoms worsened over the next few days, so she presented for an eye exam and was diagnosed with episcleritis initially and then scleritis.  2. No history of ocular trauma, surgery, laser, or injection.    Medical history:  1. GERD  2. Constipation  3. H/o anemia  4. Endometriosis  5. Raynaud's  6. Depression  7. S/p ear tubes placement (1995), appendectomy (~2008), wisdom teeth removal (~2009), tonsillectomy (June 2018), and ovarian cysts removal (Oct 2018)  No systemic inflammatory disease found by rheumatology evaluation in 2019.    Family history: Negative for ocular disease. Positive for celiac disease (mother), Charcot-Christen-Tooth disorder (maternal  aunt), thyroid disease, diabetes mellitus.    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 She reports fewer than 1 alcoholic beverage per 2 weeks. No IV drug use. No pets. She has never lived outside the [place]. She is not pregnant and is not planning to become pregnant in the near future.    Laboratory/Imaging Results:  She reports negative Tb testing within the last 2 years (for work).  5.22.19 syphilis IgM/IgG, Lyme, complete blood count (CBC) with diff, RF, ANCA, anti-MPO, anti-Pro3, uric acid, ESR (10), and CRP all normal/negative.  6.5.19 lysozyme, ACE (35, normal 8-52), and HLA-B27 all normal/negative.  7.10.19 SI joint Xray performed - results not available for review.  7.19.19 Quantiferon-Tb, anti-CCP, EMMA, anti-dsDNA all normal/negative.    Ocular Imaging:  Macular OCT 7.19.19:  Right eye: thick choroid on AVA, no chorioretinal folds, good foveal contour, no IRF/SRF,  um  Left eye: thick choroid on AVA, no chorioretinal folds, good foveal contour, no IRF/SRF,  um  Choroidal thickness symmetric, with right eye ~ left eye.    Retinal nerve fiber layer OCT 7.19.19: within normal limits both eyes.    Impression/Plan:  1. Idiopathic non-necrotizing anterior scleritis left eye. No associated keratitis, uveitis, or posterior scleritis. Although herpetic etiology is a consideration, the history is not suggestive (could consider an empiric course of Valtrex in the future if poorly responsive to IMT). Poor tolerance of oral NSAIDs. Minimal activity today on 20 mg/day oral prednisone and 25 mg/wk methotrexate (exam improved today compared to last visit when patient on 30 mg/day oral prednisone and 22.5 mg/wk methotrexate), so there appears to be at least a partial effect from methotrexate.   - Continue oral prednisone at 20 mg/day for 3 weeks then 15 mg/day for 3 weeks then 10 mg/day (may adjust dose if needed for mild flare while tapering).    - Bactrim DS every  other day for Pneumocystis pneumonia prophylaxis while taking >= 20 mg/day oral prednisone   - Calcium-vitamin D supplementation while on prednisone.   - Continue methotrexate 25 mg/wk, appreciate management by [place] [facility].   - If methotrexate is ineffective, alternative IMT such as CellCept (goal dose 5490-3939 mg twice a day) or Humira q2wk with rheumatology management should be considered. Since there appears to be at least a partial effect with methotrexate, will re-evaluate again next visit with oral prednisone taper. In the meantime, it may be helpful to appeal Humira; I am happy to provide a letter if this chart note is not sufficient.    Return to uveitis clinic in 7 weeks, V/T.    Attending Physician Attestation:  Complete documentation of historical and exam elements from today's encounter can be found in the full encounter summary report (not reduplicated in this progress note).  I personally obtained the chief complaint(s) and history of present illness.  I confirmed and edited as necessary the review of systems, past medical/surgical history, family history, social history, and examination findings as documented by others; and I examined the patient myself.  I personally reviewed the relevant tests, images, and reports as documented above.  I formulated and edited as necessary the assessment and plan and discussed the findings and management plan with the patient and family.  - Adriana Peters M.D.    Sincerely,      Adriana Peters MD

## 2019-11-22 NOTE — NURSING NOTE
Chief Complaint(s) and History of Present Illness(es)     Uveitis Follow-Up     In left eye.  Associated symptoms include eye pain and redness.  Negative for tearing and dryness.              Comments     5-6 week f/u for Scleritis/episcleritis left eye. Pt notes no changes in vision since her last visit. Pt c/o some eye pain (worse in the AM, dull headache behind the LE) and redness x the last 3 days.     Ocular meds: no eye drops.    Rachel Watt [facility] 12:42 PM November 22, 2019

## 2020-01-17 ENCOUNTER — OFFICE VISIT (OUTPATIENT)
Dept: OPHTHALMOLOGY | Facility: CLINIC | Age: 25
End: 2020-01-17
Attending: OPHTHALMOLOGY
Payer: COMMERCIAL

## 2020-01-17 DIAGNOSIS — H15.012 ANTERIOR SCLERITIS OF EYE, LEFT: Primary | ICD-10-CM

## 2020-01-17 PROCEDURE — G0463 HOSPITAL OUTPT CLINIC VISIT: HCPCS | Mod: ZF

## 2020-01-17 RX ORDER — PREDNISONE 2.5 MG/1
5 TABLET ORAL DAILY
Qty: 60 TABLET | Refills: 1 | Status: SHIPPED | OUTPATIENT
Start: 2020-01-17 | End: 2020-06-12

## 2020-01-17 RX ORDER — BUSPIRONE HYDROCHLORIDE 10 MG/1
10 TABLET ORAL
COMMUNITY
Start: 2020-01-14 | End: 2021-01-18

## 2020-01-17 RX ORDER — LORAZEPAM 0.5 MG/1
.5-1 TABLET ORAL
COMMUNITY
Start: 2019-12-31

## 2020-01-17 ASSESSMENT — SLIT LAMP EXAM - LIDS
COMMENTS: NORMAL
COMMENTS: NORMAL

## 2020-01-17 ASSESSMENT — EXTERNAL EXAM - LEFT EYE: OS_EXAM: NORMAL

## 2020-01-17 ASSESSMENT — TONOMETRY
OS_IOP_MMHG: 21
OD_IOP_MMHG: 21
IOP_METHOD: ICARE

## 2020-01-17 ASSESSMENT — VISUAL ACUITY
OD_SC+: -2
OD_SC: 20/15
OS_SC: 20/15
METHOD: SNELLEN - LINEAR

## 2020-01-17 ASSESSMENT — CONF VISUAL FIELD
OD_NORMAL: 1
METHOD: COUNTING FINGERS
OS_NORMAL: 1

## 2020-01-17 ASSESSMENT — EXTERNAL EXAM - RIGHT EYE: OD_EXAM: NORMAL

## 2020-01-17 ASSESSMENT — CUP TO DISC RATIO
OD_RATIO: 0.25
OS_RATIO: 0.25

## 2020-01-17 NOTE — LETTER
1/17/2020      RE: Hayley Parson  [address]  [place] [address]       HPI: Hayley Parson is a 24 year old female here for 2 month follow up of scleritis left eye. She has tapered oral prednisone to 10 mg/day (has been at this dose for 1 week). She reports that her ocular redness seems better, but her left eye has been watering intermittently. No vision changes.    Current ocular medications: oral prednisone (10 mg/day for 1 week, variable doses since 6.4.19 with max dose 60 mg/day), naproxen as needed (GI upset so not taking often), methotrexate 25 mg/wk (increased from 22.5 mg/wk in early Nov 2019, increased from 12.5 mg/wk to 22.5 mg/wk beginning of Oct 2019, started in mid-August 2019, s/e nausea and fatigue), folic acid 2 mg/day. IMT prescribed by rheum service (Dr. Haritha Olmedo and Cookie BARRETT-CNP in [place]).  Prior ocular medications: Predforte (ineffective), ibuprofen (only partially effective, GI upset), indomethacin (only partially effective - helped with pain but not redness, GI upset).    Ocular history:   1. Scleritis/episcleritis left eye, first diagnosed 5.13.19.   - Sudden onset of intense left eye redness on the morning of 5.10.19, with left eye pain developing in the pm. Her symptoms worsened over the next few days, so she presented for an eye exam and was diagnosed with episcleritis initially and then scleritis.  2. No history of ocular trauma, surgery, laser, or injection.    Medical history:  1. GERD  2. Constipation  3. H/o anemia  4. Endometriosis  5. Raynaud's  6. Depression  7. Left hip impingement  8. S/p ear tubes placement (1995), appendectomy (~2008), wisdom teeth removal (~2009), tonsillectomy (June 2018), and ovarian cysts removal (Oct 2018)  No systemic inflammatory disease found by rheumatology evaluation in 2019.    Family history: Negative for ocular disease. Positive for celiac disease (mother), Charcot-Christen-Tooth disorder (maternal aunt), thyroid disease,  diabetes mellitus.    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 She reports fewer than 1 alcoholic beverage per 2 weeks. No IV drug use. No pets. She has never lived outside the [place]. She is not pregnant and is not planning to become pregnant in the near future.    Laboratory/Imaging Results:  She reports negative Tb testing within the last 2 years (for work).  5.22.19 syphilis IgM/IgG, Lyme, complete blood count (CBC) with diff, RF, ANCA, anti-MPO, anti-Pro3, uric acid, ESR (10), and CRP all normal/negative.  6.5.19 lysozyme, ACE (35, normal 8-52), and HLA-B27 all normal/negative.  7.10.19 SI joint Xray performed - results not available for review.  7.19.19 Quantiferon-Tb, anti-CCP, EMMA, anti-dsDNA all normal/negative.    Ocular Imaging:  Macular OCT 7.19.19:  Right eye: thick choroid on AVA, no chorioretinal folds, good foveal contour, no IRF/SRF,  um  Left eye: thick choroid on AVA, no chorioretinal folds, good foveal contour, no IRF/SRF,  um  Choroidal thickness symmetric, with right eye ~ left eye.    Retinal nerve fiber layer OCT 7.19.19: within normal limits both eyes.    Impression/Plan:  1. Idiopathic non-necrotizing anterior scleritis left eye. No associated keratitis, uveitis, or posterior scleritis. Although herpetic etiology is a consideration, the history is not suggestive (could consider an empiric course of Valtrex in the future if poorly responsive to IMT but I think this is less likely). Poor tolerance of oral NSAIDs. No activity today on 10 mg/day oral prednisone and 25 mg/wk methotrexate (exam improved today compared to last visit when patient was on 20 mg/day oral prednisone and 25 mg/wk methotrexate), so methotrexate appears to be helpful.   - Continue to slowly taper oral prednisone: 10 mg/day for 2 more weeks then 7.5 mg/day for 3 weeks then 5 mg/day for 3 weeks then 2.5 mg/day for 3 weeks then stop (may adjust dose if needed for mild  flare while tapering).    - Calcium-vitamin D supplementation while on prednisone.   - Continue methotrexate 25 mg/wk, appreciate management by [facility].   - If methotrexate is ineffective, alternative IMT such as CellCept (goal dose 4686-7962 mg twice a day) or Humira q2wk (would need to appeal) with rheumatology management should be considered. At this point there appears to be a good effect with methotrexate.   - I discussed long-term goals with patient and her mother today: my goal for immunosuppression is 1-2 years of scleritis quiescence off corticosteroids, then may consider tapering systemic immunosuppression.  2. Tearing left eye, likely ocular surface dryness.   - Use over the counter artificial tears as needed.    Return to uveitis clinic in 9 weeks (1 week after 5->2.5 mg/day oral prednisone), V/T.    Attending Physician Attestation:  Complete documentation of historical and exam elements from today's encounter can be found in the full encounter summary report (not reduplicated in this progress note).  I personally obtained the chief complaint(s) and history of present illness.  I confirmed and edited as necessary the review of systems, past medical/surgical history, family history, social history, and examination findings as documented by others; and I examined the patient myself.  I personally reviewed the relevant tests, images, and reports as documented above.  I formulated and edited as necessary the assessment and plan and discussed the findings and management plan with the patient and family.  - Adriana Peters M.D.    Sincerely,    Adriana Petres MD

## 2020-01-17 NOTE — PATIENT INSTRUCTIONS
Continue methotrexate and folic acid.  Oral prednisone: 10 mg/day for 2 more weeks, then 7.5 mg/day for 3 weeks, then 5 mg/day for 3 weeks, then 2.5 mg/day for 3 weeks then stop.

## 2020-01-17 NOTE — NURSING NOTE
Chief Complaints and History of Present Illnesses   Patient presents with     Follow Up     Anterior scleritis of eye, left     Chief Complaint(s) and History of Present Illness(es)     Follow Up     Associated symptoms: tearing (LE) and itching.  Negative for eye pain, dryness, flashes, floaters, photophobia, discharge and foreign body sensation    Comments: Anterior scleritis of eye, left              Comments     Pt states vision is the same since last visit.  Pt has increased tearing LE and itchiness when tearing.   Pt has no pain or other concerns at this time.    WAQAS Albert January 17, 2020 12:57 PM

## 2020-01-17 NOTE — PROGRESS NOTES
HPI: Hayley Parson is a 24 year old female here for 2 month follow up of scleritis left eye. She has tapered oral prednisone to 10 mg/day (has been at this dose for 1 week). She reports that her ocular redness seems better, but her left eye has been watering intermittently. No vision changes.    Current ocular medications: oral prednisone (10 mg/day for 1 week, variable doses since 6.4.19 with max dose 60 mg/day), naproxen as needed (GI upset so not taking often), methotrexate 25 mg/wk (increased from 22.5 mg/wk in early Nov 2019, increased from 12.5 mg/wk to 22.5 mg/wk beginning of Oct 2019, started in mid-August 2019, s/e nausea and fatigue), folic acid 2 mg/day. IMT prescribed by rheum service (Dr. Haritha Olmedo and Cookie BARRETT-CNP in [place]).  Prior ocular medications: Predforte (ineffective), ibuprofen (only partially effective, GI upset), indomethacin (only partially effective - helped with pain but not redness, GI upset).    Ocular history:   1. Scleritis/episcleritis left eye, first diagnosed 5.13.19.   - Sudden onset of intense left eye redness on the morning of 5.10.19, with left eye pain developing in the pm. Her symptoms worsened over the next few days, so she presented for an eye exam and was diagnosed with episcleritis initially and then scleritis.  2. No history of ocular trauma, surgery, laser, or injection.    Medical history:  1. GERD  2. Constipation  3. H/o anemia  4. Endometriosis  5. Raynaud's  6. Depression  7. Left hip impingement  8. S/p ear tubes placement (1995), appendectomy (~2008), wisdom teeth removal (~2009), tonsillectomy (June 2018), and ovarian cysts removal (Oct 2018)  No systemic inflammatory disease found by rheumatology evaluation in 2019.    Family history: Negative for ocular disease. Positive for celiac disease (mother), Charcot-Christen-Tooth disorder (maternal aunt), thyroid disease, diabetes mellitus.    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 She never smoked. She reports fewer than 1 alcoholic beverage per 2 weeks. No IV drug use. No pets. She has never lived outside the [place]. She is not pregnant and is not planning to become pregnant in the near future.    Laboratory/Imaging Results:  She reports negative Tb testing within the last 2 years (for work).  5.22.19 syphilis IgM/IgG, Lyme, complete blood count (CBC) with diff, RF, ANCA, anti-MPO, anti-Pro3, uric acid, ESR (10), and CRP all normal/negative.  6.5.19 lysozyme, ACE (35, normal 8-52), and HLA-B27 all normal/negative.  7.10.19 SI joint Xray performed - results not available for review.  7.19.19 Quantiferon-Tb, anti-CCP, EMMA, anti-dsDNA all normal/negative.    Ocular Imaging:  Macular OCT 7.19.19:  Right eye: thick choroid on AVA, no chorioretinal folds, good foveal contour, no IRF/SRF,  um  Left eye: thick choroid on AVA, no chorioretinal folds, good foveal contour, no IRF/SRF,  um  Choroidal thickness symmetric, with right eye ~ left eye.    Retinal nerve fiber layer OCT 7.19.19: within normal limits both eyes.    Impression/Plan:  1. Idiopathic non-necrotizing anterior scleritis left eye. No associated keratitis, uveitis, or posterior scleritis. Although herpetic etiology is a consideration, the history is not suggestive (could consider an empiric course of Valtrex in the future if poorly responsive to IMT but I think this is less likely). Poor tolerance of oral NSAIDs. No activity today on 10 mg/day oral prednisone and 25 mg/wk methotrexate (exam improved today compared to last visit when patient was on 20 mg/day oral prednisone and 25 mg/wk methotrexate), so methotrexate appears to be helpful.   - Continue to slowly taper oral prednisone: 10 mg/day for 2 more weeks then 7.5 mg/day for 3 weeks then 5 mg/day for 3 weeks then 2.5 mg/day for 3 weeks then stop (may adjust dose if needed for mild flare while tapering).    - Calcium-vitamin D supplementation while on  prednisone.   - Continue methotrexate 25 mg/wk, appreciate management by [facility].   - If methotrexate is ineffective, alternative IMT such as CellCept (goal dose 9368-9957 mg twice a day) or Humira q2wk (would need to appeal) with rheumatology management should be considered. At this point there appears to be a good effect with methotrexate.   - I discussed long-term goals with patient and her mother today: my goal for immunosuppression is 1-2 years of scleritis quiescence off corticosteroids, then may consider tapering systemic immunosuppression.  2. Tearing left eye, likely ocular surface dryness.   - Use over the counter artificial tears as needed.    Return to uveitis clinic in 9 weeks (1 week after 5->2.5 mg/day oral prednisone), V/T.    Attending Physician Attestation:  Complete documentation of historical and exam elements from today's encounter can be found in the full encounter summary report (not reduplicated in this progress note).  I personally obtained the chief complaint(s) and history of present illness.  I confirmed and edited as necessary the review of systems, past medical/surgical history, family history, social history, and examination findings as documented by others; and I examined the patient myself.  I personally reviewed the relevant tests, images, and reports as documented above.  I formulated and edited as necessary the assessment and plan and discussed the findings and management plan with the patient and family.  - Adriana Peters M.D.

## 2020-03-11 ENCOUNTER — HEALTH MAINTENANCE LETTER (OUTPATIENT)
Age: 25
End: 2020-03-11

## 2020-03-17 ENCOUNTER — MYC MEDICAL ADVICE (OUTPATIENT)
Dept: OPHTHALMOLOGY | Facility: CLINIC | Age: 25
End: 2020-03-17

## 2020-06-12 ENCOUNTER — OFFICE VISIT (OUTPATIENT)
Dept: OPHTHALMOLOGY | Facility: CLINIC | Age: 25
End: 2020-06-12
Attending: OPHTHALMOLOGY
Payer: COMMERCIAL

## 2020-06-12 DIAGNOSIS — H15.012 ANTERIOR SCLERITIS OF EYE, LEFT: Primary | ICD-10-CM

## 2020-06-12 PROCEDURE — G0463 HOSPITAL OUTPT CLINIC VISIT: HCPCS | Mod: ZF

## 2020-06-12 ASSESSMENT — VISUAL ACUITY
OS_SC: 20/15
OD_SC+: -1
OD_SC: 20/15
METHOD: SNELLEN - LINEAR

## 2020-06-12 ASSESSMENT — CONF VISUAL FIELD
OD_NORMAL: 1
OS_NORMAL: 1
METHOD: COUNTING FINGERS

## 2020-06-12 ASSESSMENT — EXTERNAL EXAM - RIGHT EYE: OD_EXAM: NORMAL

## 2020-06-12 ASSESSMENT — SLIT LAMP EXAM - LIDS
COMMENTS: NORMAL
COMMENTS: NORMAL

## 2020-06-12 ASSESSMENT — TONOMETRY
IOP_METHOD: ICARE
OD_IOP_MMHG: 17
OS_IOP_MMHG: 17

## 2020-06-12 ASSESSMENT — EXTERNAL EXAM - LEFT EYE: OS_EXAM: NORMAL

## 2020-06-12 NOTE — PATIENT INSTRUCTIONS
Continue folic acid 2 mg/day  Decrease oral methotrexate to 9 pills (22.5 mg)/week for 6 weeks then 8 pills (20 mg)/week.

## 2020-06-12 NOTE — LETTER
6/12/2020      RE: Hayley Parson  [address]  [place] [address]       HPI: Hayley Parson is a 25 year old female here for 5 month follow up of scleritis left eye. She has been off oral prednisone since March 2020. No vision changes. She notices eye redness at night only. No eye pain. She reports feeling malaise, possibly related to methotrexate since she felt better when she had a week off methotrexate.    Current ocular medications: methotrexate 25 mg/wk (increased from 22.5 mg/wk in early Nov 2019, increased from 12.5 mg/wk to 22.5 mg/wk beginning of Oct 2019, started in mid-August 2019, s/e nausea and fatigue), folic acid 2 mg/day. IMT prescribed by rheum service (Dr. Haritha Olmedo and Cookie BARRETT-CNP in [place]).    Prior ocular medications: Predforte (ineffective), ibuprofen (only partially effective, GI upset), indomethacin (only partially effective - helped with pain but not redness, GI upset), naproxen (GI upset), oral prednisone (off since March 2020, max dose 60 mg/day).    Ocular history:   1. Scleritis/episcleritis left eye, first diagnosed 5.13.19.   - Sudden onset of intense left eye redness on the morning of 5.10.19, with left eye pain developing in the pm. Her symptoms worsened over the next few days, so she presented for an eye exam and was diagnosed with episcleritis initially and then scleritis.  2. Dry eye syndrome both eyes.  3. No history of ocular trauma, surgery, laser, or injection.    Medical history:  1. GERD  2. Constipation  3. H/o anemia  4. Endometriosis  5. Raynaud's  6. Depression  7. Left hip impingement  8. S/p ear tubes placement (1995), appendectomy (~2008), wisdom teeth removal (~2009), tonsillectomy (June 2018), and ovarian cysts removal (Oct 2018)  No systemic inflammatory disease found by rheumatology evaluation in 2019.    Family history: Negative for ocular disease. Positive for celiac disease (mother), Charcot-Christen-Tooth disorder (maternal aunt),  thyroid disease, diabetes mellitus.    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 She reports fewer than 1 alcoholic beverage per 2 weeks. No IV drug use. No pets. She has never lived outside the [place]. She is not pregnant and is not planning to become pregnant in the near future.    Laboratory/Imaging Results:  She reports negative Tb testing within the last 2 years (for work).  5.22.19 syphilis IgM/IgG, Lyme, complete blood count (CBC) with diff, RF, ANCA, anti-MPO, anti-Pro3, uric acid, ESR (10), and CRP all normal/negative.  6.5.19 lysozyme, ACE (35, normal 8-52), and HLA-B27 all normal/negative.  7.10.19 SI joint Xray performed - results not available for review.  7.19.19 Quantiferon-Tb, anti-CCP, EMMA, anti-dsDNA all normal/negative.    Ocular Imaging:  Macular OCT 7.19.19:  Right eye: thick choroid on AVA, no chorioretinal folds, good foveal contour, no IRF/SRF,  um  Left eye: thick choroid on AVA, no chorioretinal folds, good foveal contour, no IRF/SRF,  um  Choroidal thickness symmetric, with right eye ~ left eye.    Retinal nerve fiber layer OCT 7.19.19: within normal limits both eyes.    Impression/Plan:  1. Idiopathic non-necrotizing anterior scleritis left eye. No associated keratitis, uveitis, or posterior scleritis. Although herpetic etiology is a consideration, the history is not suggestive and her scleritis has responded well to IMT. Poor tolerance of oral NSAIDs. No activity today on 25 mg/wk methotrexate; off oral prednisone ~3 months.   - Continue off oral prednisone   - Continue methotrexate, may decrease to 22.5 mg/wk for 6 weeks then 20 mg/wk, appreciate management by [facility].   - If scleritis remains quiescent off corticosteroids, may consider tapering off methotrexate in ~1 year  2. Tearing left eye, likely ocular surface dryness.   - Use over the counter artificial tears and artifical félix as needed.    Return to uveitis clinic  in 3 months, V/T.    Attending Physician Attestation:  Complete documentation of historical and exam elements from today's encounter can be found in the full encounter summary report (not reduplicated in this progress note).  I personally obtained the chief complaint(s) and history of present illness.  I confirmed and edited as necessary the review of systems, past medical/surgical history, family history, social history, and examination findings as documented by others; and I examined the patient myself.  I personally reviewed the relevant tests, images, and reports as documented above.  I formulated and edited as necessary the assessment and plan and discussed the findings and management plan with the patient and family.  - Adriana Peters M.D.    Sincerely,    Adirana Peters MD

## 2020-06-12 NOTE — NURSING NOTE
Chief Complaints and History of Present Illnesses   Patient presents with     Scleritis Follow Up     Chief Complaint(s) and History of Present Illness(es)     Scleritis Follow Up     Laterality: left eye    Onset: months ago    Quality: States va is the same since last visit          Associated symptoms: headache.  Negative for floaters, flashes, photophobia and eye pain    Pain scale: 0/10              Comments     Anterior scleritis of ARELY [place] COT 1:31 PM June 12, 2020

## 2020-06-12 NOTE — PROGRESS NOTES
HPI: Hayley Parson is a 25 year old female here for 5 month follow up of scleritis left eye. She has been off oral prednisone since March 2020. No vision changes. She notices eye redness at night only. No eye pain. She reports feeling malaise, possibly related to methotrexate since she felt better when she had a week off methotrexate.    Current ocular medications: methotrexate 25 mg/wk (increased from 22.5 mg/wk in early Nov 2019, increased from 12.5 mg/wk to 22.5 mg/wk beginning of Oct 2019, started in mid-August 2019, s/e nausea and fatigue), folic acid 2 mg/day. IMT prescribed by rheum service (Dr. Haritha Olmedo and Cookie BARRETT-CNP in [place]).    Prior ocular medications: Predforte (ineffective), ibuprofen (only partially effective, GI upset), indomethacin (only partially effective - helped with pain but not redness, GI upset), naproxen (GI upset), oral prednisone (off since March 2020, max dose 60 mg/day).    Ocular history:   1. Scleritis/episcleritis left eye, first diagnosed 5.13.19.   - Sudden onset of intense left eye redness on the morning of 5.10.19, with left eye pain developing in the pm. Her symptoms worsened over the next few days, so she presented for an eye exam and was diagnosed with episcleritis initially and then scleritis.  2. Dry eye syndrome both eyes.  3. No history of ocular trauma, surgery, laser, or injection.    Medical history:  1. GERD  2. Constipation  3. H/o anemia  4. Endometriosis  5. Raynaud's  6. Depression  7. Left hip impingement  8. S/p ear tubes placement (1995), appendectomy (~2008), wisdom teeth removal (~2009), tonsillectomy (June 2018), and ovarian cysts removal (Oct 2018)  No systemic inflammatory disease found by rheumatology evaluation in 2019.    Family history: Negative for ocular disease. Positive for celiac disease (mother), Charcot-Christen-Tooth disorder (maternal aunt), thyroid disease, diabetes mellitus.    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 She reports fewer than 1 alcoholic beverage per 2 weeks. No IV drug use. No pets. She has never lived outside the [place]. She is not pregnant and is not planning to become pregnant in the near future.    Laboratory/Imaging Results:  She reports negative Tb testing within the last 2 years (for work).  5.22.19 syphilis IgM/IgG, Lyme, complete blood count (CBC) with diff, RF, ANCA, anti-MPO, anti-Pro3, uric acid, ESR (10), and CRP all normal/negative.  6.5.19 lysozyme, ACE (35, normal 8-52), and HLA-B27 all normal/negative.  7.10.19 SI joint Xray performed - results not available for review.  7.19.19 Quantiferon-Tb, anti-CCP, EMMA, anti-dsDNA all normal/negative.    Ocular Imaging:  Macular OCT 7.19.19:  Right eye: thick choroid on AVA, no chorioretinal folds, good foveal contour, no IRF/SRF,  um  Left eye: thick choroid on AVA, no chorioretinal folds, good foveal contour, no IRF/SRF,  um  Choroidal thickness symmetric, with right eye ~ left eye.    Retinal nerve fiber layer OCT 7.19.19: within normal limits both eyes.    Impression/Plan:  1. Idiopathic non-necrotizing anterior scleritis left eye. No associated keratitis, uveitis, or posterior scleritis. Although herpetic etiology is a consideration, the history is not suggestive and her scleritis has responded well to IMT. Poor tolerance of oral NSAIDs. No activity today on 25 mg/wk methotrexate; off oral prednisone ~3 months.   - Continue off oral prednisone   - Continue methotrexate, may decrease to 22.5 mg/wk for 6 weeks then 20 mg/wk, appreciate management by [facility].   - If scleritis remains quiescent off corticosteroids, may consider tapering off methotrexate in ~1 year  2. Tearing left eye, likely ocular surface dryness.   - Use over the counter artificial tears and artifical félix as needed.    Return to uveitis clinic in 3 months, V/T.    Attending Physician Attestation:  Complete  documentation of historical and exam elements from today's encounter can be found in the full encounter summary report (not reduplicated in this progress note).  I personally obtained the chief complaint(s) and history of present illness.  I confirmed and edited as necessary the review of systems, past medical/surgical history, family history, social history, and examination findings as documented by others; and I examined the patient myself.  I personally reviewed the relevant tests, images, and reports as documented above.  I formulated and edited as necessary the assessment and plan and discussed the findings and management plan with the patient and family.  - Adriana Peters M.D.

## 2020-09-04 ENCOUNTER — OFFICE VISIT (OUTPATIENT)
Dept: OPHTHALMOLOGY | Facility: CLINIC | Age: 25
End: 2020-09-04
Attending: OPHTHALMOLOGY
Payer: COMMERCIAL

## 2020-09-04 DIAGNOSIS — H15.012 ANTERIOR SCLERITIS OF EYE, LEFT: Primary | ICD-10-CM

## 2020-09-04 DIAGNOSIS — H04.122 DRY EYE SYNDROME OF LEFT EYE: ICD-10-CM

## 2020-09-04 PROCEDURE — G0463 HOSPITAL OUTPT CLINIC VISIT: HCPCS | Mod: ZF

## 2020-09-04 RX ORDER — FAMOTIDINE 20 MG/1
TABLET, FILM COATED ORAL
COMMUNITY
Start: 2020-08-13

## 2020-09-04 RX ORDER — METHOTREXATE 25 MG/ML
22.5 INJECTION, SOLUTION INTRA-ARTERIAL; INTRAMUSCULAR; INTRAVENOUS
COMMUNITY
Start: 2020-06-15

## 2020-09-04 RX ORDER — DEXTROAMPHETAMINE SACCHARATE, AMPHETAMINE ASPARTATE, DEXTROAMPHETAMINE SULFATE AND AMPHETAMINE SULFATE 1.25; 1.25; 1.25; 1.25 MG/1; MG/1; MG/1; MG/1
TABLET ORAL
COMMUNITY
Start: 2020-08-21

## 2020-09-04 RX ORDER — DEXTROAMPHETAMINE SULFATE, DEXTROAMPHETAMINE SACCHARATE, AMPHETAMINE SULFATE AND AMPHETAMINE ASPARTATE 5; 5; 5; 5 MG/1; MG/1; MG/1; MG/1
CAPSULE, EXTENDED RELEASE ORAL
COMMUNITY
Start: 2020-04-20

## 2020-09-04 ASSESSMENT — CONF VISUAL FIELD
OS_NORMAL: 1
METHOD: COUNTING FINGERS
OD_NORMAL: 1

## 2020-09-04 ASSESSMENT — EXTERNAL EXAM - RIGHT EYE: OD_EXAM: NORMAL

## 2020-09-04 ASSESSMENT — SLIT LAMP EXAM - LIDS
COMMENTS: NORMAL
COMMENTS: NORMAL

## 2020-09-04 ASSESSMENT — EXTERNAL EXAM - LEFT EYE: OS_EXAM: NORMAL

## 2020-09-04 ASSESSMENT — TONOMETRY
OS_IOP_MMHG: 19
IOP_METHOD: TONOPEN
OD_IOP_MMHG: 18

## 2020-09-04 ASSESSMENT — VISUAL ACUITY
OS_SC: 20/15
OD_SC: 20/20
METHOD: SNELLEN - LINEAR

## 2020-09-04 NOTE — PROGRESS NOTES
HPI: Hayley Parson is a 25 year old female here for 3 month follow up of scleritis left eye. She has been off oral prednisone since March 2020. No vision changes. She reports her nausea with methotrexate has improved since switching to injectable. She reports that her eye has been intermittently hurting, more frequently for the last 2 weeks. No eye redness.    Current ocular medications: methotrexate 22.55 mg/wk (injectable, increased from 22.5 mg/wk in early Nov 2019, increased from 12.5 mg/wk to 22.5 mg/wk beginning of Oct 2019, started in mid-August 2019, s/e nausea and fatigue better with injectable), folic acid 2 mg/day. IMT prescribed by rheum service (Dr. Haritha Olmedo and Cookie BARRETT-CNP in [place]).    Prior ocular medications: Predforte (ineffective), ibuprofen (only partially effective, GI upset), indomethacin (only partially effective - helped with pain but not redness, GI upset), naproxen (GI upset), oral prednisone (off since March 2020, max dose 60 mg/day).    Ocular history:   1. Scleritis/episcleritis left eye, first diagnosed 5.13.19.   - Sudden onset of intense left eye redness on the morning of 5.10.19, with left eye pain developing in the pm. Her symptoms worsened over the next few days, so she presented for an eye exam and was diagnosed with episcleritis initially and then scleritis.  2. Dry eye syndrome both eyes.  3. No history of ocular trauma, surgery, laser, or injection.    Medical history:  1. GERD  2. Constipation  3. H/o anemia  4. Endometriosis  5. Raynaud's  6. Depression  7. Left hip impingement  8. S/p ear tubes placement (1995), appendectomy (~2008), wisdom teeth removal (~2009), tonsillectomy (June 2018), and ovarian cysts removal (Oct 2018)  No systemic inflammatory disease found by rheumatology evaluation in 2019.    Family history: Negative for ocular disease. Positive for celiac disease (mother), Charcot-Christen-Tooth disorder (maternal aunt), thyroid disease,  diabetes mellitus.    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 She reports fewer than 1 alcoholic beverage per 2 weeks. No IV drug use. No pets. She has never lived outside the [place]. She is not pregnant and is not planning to become pregnant in the near future.    Laboratory/Imaging Results:  She reports negative Tb testing within the last 2 years (for work).  5.22.19 syphilis IgM/IgG, Lyme, complete blood count (CBC) with diff, RF, ANCA, anti-MPO, anti-Pro3, uric acid, ESR (10), and CRP all normal/negative.  6.5.19 lysozyme, ACE (35, normal 8-52), and HLA-B27 all normal/negative.  7.10.19 SI joint Xray performed - results not available for review.  7.19.19 Quantiferon-Tb, anti-CCP, EMMA, anti-dsDNA all normal/negative.    Ocular Imaging:  Macular OCT 7.19.19:  Right eye: thick choroid on AVA, no chorioretinal folds, good foveal contour, no IRF/SRF,  um  Left eye: thick choroid on AVA, no chorioretinal folds, good foveal contour, no IRF/SRF,  um  Choroidal thickness symmetric, with right eye ~ left eye.    Retinal nerve fiber layer OCT 7.19.19: within normal limits both eyes.    Impression/Plan:  1. Idiopathic non-necrotizing anterior scleritis left eye. No associated keratitis, uveitis, or posterior scleritis. Although herpetic etiology is a consideration, the history is not suggestive and her scleritis has responded well to IMT. Poor tolerance of oral NSAIDs. No activity today on 22.5 mg/wk methotrexate; off oral prednisone ~6 months.   - Continue off oral prednisone   - Continue methotrexate at 22.5 mg/wk, appreciate management by [facility].   - If scleritis remains quiescent off corticosteroids, may consider tapering off methotrexate in summer 2021  2. Pain left eye, likely ocular surface dryness.   - Use over the counter artificial tears (increase to three times a day) and artifical tear félix at night.    Return to uveitis clinic in 4-5 months,  V/T.    Attending Physician Attestation:  Complete documentation of historical and exam elements from today's encounter can be found in the full encounter summary report (not reduplicated in this progress note).  I personally obtained the chief complaint(s) and history of present illness.  I confirmed and edited as necessary the review of systems, past medical/surgical history, family history, social history, and examination findings as documented by others; and I examined the patient myself.  I personally reviewed the relevant tests, images, and reports as documented above.  I formulated and edited as necessary the assessment and plan and discussed the findings and management plan with the patient and family.  - Adriana Peters M.D.

## 2020-09-04 NOTE — NURSING NOTE
Chief Complaints and History of Present Illnesses   Patient presents with     Follow Up     Anterior scleritis of eye, left     Chief Complaint(s) and History of Present Illness(es)     Follow Up     Laterality: left eye    Course: stable    Associated symptoms: eye pain (left more frequently than right, dull ache type pain), photophobia and headache (intermittent, mild).  Negative for tearing    Treatments tried: artificial tears    Pain scale: 1/10 (Left eye currently)    Comments: Anterior scleritis of eye, left              Comments     She states that her vision has seemed stable in both eyes since her last eye exam.  She had a severe migraine three weeks ago (a first in the past 3 years).  The head pain lasted for three days.  Her vision had blind spots throughout those three days.  Generally her headaches are mild, manageable.    Laura Whitman, WAQAS 12:33 PM  September 4, 2020

## 2020-09-04 NOTE — LETTER
9/4/2020      RE: Hayley Parson  [address]  [place] [address]       HPI: Hayley Parson is a 25 year old female here for 3 month follow up of scleritis left eye. She has been off oral prednisone since March 2020. No vision changes. She reports her nausea with methotrexate has improved since switching to injectable. She reports that her eye has been intermittently hurting, more frequently for the last 2 weeks. No eye redness.    Current ocular medications: methotrexate 22.55 mg/wk (injectable, increased from 22.5 mg/wk in early Nov 2019, increased from 12.5 mg/wk to 22.5 mg/wk beginning of Oct 2019, started in mid-August 2019, s/e nausea and fatigue better with injectable), folic acid 2 mg/day. IMT prescribed by rheum service (Dr. Haritha Olmedo and Cookie BARRETT-CNP in [place]).    Prior ocular medications: Predforte (ineffective), ibuprofen (only partially effective, GI upset), indomethacin (only partially effective - helped with pain but not redness, GI upset), naproxen (GI upset), oral prednisone (off since March 2020, max dose 60 mg/day).    Ocular history:   1. Scleritis/episcleritis left eye, first diagnosed 5.13.19.   - Sudden onset of intense left eye redness on the morning of 5.10.19, with left eye pain developing in the pm. Her symptoms worsened over the next few days, so she presented for an eye exam and was diagnosed with episcleritis initially and then scleritis.  2. Dry eye syndrome both eyes.  3. No history of ocular trauma, surgery, laser, or injection.    Medical history:  1. GERD  2. Constipation  3. H/o anemia  4. Endometriosis  5. Raynaud's  6. Depression  7. Left hip impingement  8. S/p ear tubes placement (1995), appendectomy (~2008), wisdom teeth removal (~2009), tonsillectomy (June 2018), and ovarian cysts removal (Oct 2018)  No systemic inflammatory disease found by rheumatology evaluation in 2019.    Family history: Negative for ocular disease. Positive for celiac disease  (mother), Charcot-Christen-Tooth disorder (maternal aunt), thyroid disease, diabetes mellitus.    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 She reports fewer than 1 alcoholic beverage per 2 weeks. No IV drug use. No pets. She has never lived outside the [place]. She is not pregnant and is not planning to become pregnant in the near future.    Laboratory/Imaging Results:  She reports negative Tb testing within the last 2 years (for work).  5.22.19 syphilis IgM/IgG, Lyme, complete blood count (CBC) with diff, RF, ANCA, anti-MPO, anti-Pro3, uric acid, ESR (10), and CRP all normal/negative.  6.5.19 lysozyme, ACE (35, normal 8-52), and HLA-B27 all normal/negative.  7.10.19 SI joint Xray performed - results not available for review.  7.19.19 Quantiferon-Tb, anti-CCP, EMMA, anti-dsDNA all normal/negative.    Ocular Imaging:  Macular OCT 7.19.19:  Right eye: thick choroid on AVA, no chorioretinal folds, good foveal contour, no IRF/SRF,  um  Left eye: thick choroid on AVA, no chorioretinal folds, good foveal contour, no IRF/SRF,  um  Choroidal thickness symmetric, with right eye ~ left eye.    Retinal nerve fiber layer OCT 7.19.19: within normal limits both eyes.    Impression/Plan:  1. Idiopathic non-necrotizing anterior scleritis left eye. No associated keratitis, uveitis, or posterior scleritis. Although herpetic etiology is a consideration, the history is not suggestive and her scleritis has responded well to IMT. Poor tolerance of oral NSAIDs. No activity today on 22.5 mg/wk methotrexate; off oral prednisone ~6 months.   - Continue off oral prednisone   - Continue methotrexate at 22.5 mg/wk, appreciate management by [facility].   - If scleritis remains quiescent off corticosteroids, may consider tapering off methotrexate in summer 2021  2. Pain left eye, likely ocular surface dryness.   - Use over the counter artificial tears (increase to three times a day) and  artifical tear félix at night.    Return to uveitis clinic in 4-5 months, V/T.    Attending Physician Attestation:  Complete documentation of historical and exam elements from today's encounter can be found in the full encounter summary report (not reduplicated in this progress note).  I personally obtained the chief complaint(s) and history of present illness.  I confirmed and edited as necessary the review of systems, past medical/surgical history, family history, social history, and examination findings as documented by others; and I examined the patient myself.  I personally reviewed the relevant tests, images, and reports as documented above.  I formulated and edited as necessary the assessment and plan and discussed the findings and management plan with the patient and family.  - Adriana Peters M.D.    Sincerely,    Adriana Peters MD

## 2021-01-03 ENCOUNTER — HEALTH MAINTENANCE LETTER (OUTPATIENT)
Age: 26
End: 2021-01-03

## 2021-04-25 ENCOUNTER — HEALTH MAINTENANCE LETTER (OUTPATIENT)
Age: 26
End: 2021-04-25

## 2021-10-10 ENCOUNTER — HEALTH MAINTENANCE LETTER (OUTPATIENT)
Age: 26
End: 2021-10-10

## 2022-05-21 ENCOUNTER — HEALTH MAINTENANCE LETTER (OUTPATIENT)
Age: 27
End: 2022-05-21

## 2022-09-18 ENCOUNTER — HEALTH MAINTENANCE LETTER (OUTPATIENT)
Age: 27
End: 2022-09-18

## 2023-06-04 ENCOUNTER — HEALTH MAINTENANCE LETTER (OUTPATIENT)
Age: 28
End: 2023-06-04